# Patient Record
Sex: FEMALE | Race: WHITE | URBAN - METROPOLITAN AREA
[De-identification: names, ages, dates, MRNs, and addresses within clinical notes are randomized per-mention and may not be internally consistent; named-entity substitution may affect disease eponyms.]

---

## 2021-08-20 ENCOUNTER — APPOINTMENT (RX ONLY)
Dept: URBAN - METROPOLITAN AREA OTHER 13 | Facility: OTHER | Age: 21
Setting detail: DERMATOLOGY
End: 2021-08-20

## 2021-08-20 DIAGNOSIS — L68.0 HIRSUTISM: ICD-10-CM

## 2021-08-20 PROCEDURE — ? TREATMENT REGIMEN

## 2021-08-20 PROCEDURE — ? PRESCRIPTION

## 2021-08-20 PROCEDURE — 99202 OFFICE O/P NEW SF 15 MIN: CPT

## 2021-08-20 PROCEDURE — ? COUNSELING

## 2021-08-20 RX ORDER — EFLORNITHINE HYDROCHLORIDE 139 MG/G
CREAM TOPICAL BID
Qty: 1 | Refills: 6 | Status: ERX | COMMUNITY
Start: 2021-08-20

## 2021-08-20 RX ADMIN — EFLORNITHINE HYDROCHLORIDE: 139 CREAM TOPICAL at 00:00

## 2021-08-20 ASSESSMENT — LOCATION SIMPLE DESCRIPTION DERM: LOCATION SIMPLE: SUBMENTAL CHIN

## 2021-08-20 ASSESSMENT — LOCATION DETAILED DESCRIPTION DERM: LOCATION DETAILED: SUBMENTAL CHIN

## 2021-08-20 ASSESSMENT — LOCATION ZONE DERM: LOCATION ZONE: FACE

## 2021-08-20 NOTE — PROCEDURE: TREATMENT REGIMEN
Detail Level: Zone
Plan: Discussed laser hair removal vs Vaniqa cream. Discussed work up for PCOS with GYN and insulin resistant diabetes with endocrinologist or PCP.